# Patient Record
Sex: FEMALE | Race: OTHER | NOT HISPANIC OR LATINO | URBAN - METROPOLITAN AREA
[De-identification: names, ages, dates, MRNs, and addresses within clinical notes are randomized per-mention and may not be internally consistent; named-entity substitution may affect disease eponyms.]

---

## 2017-07-01 ENCOUNTER — INPATIENT (INPATIENT)
Facility: HOSPITAL | Age: 22
LOS: 4 days | Discharge: HOME | End: 2017-07-06
Attending: OBSTETRICS & GYNECOLOGY | Admitting: OBSTETRICS & GYNECOLOGY
Payer: MEDICARE

## 2017-07-01 VITALS — WEIGHT: 235.01 LBS | HEIGHT: 66 IN

## 2017-07-01 DIAGNOSIS — Z3A.00 WEEKS OF GESTATION OF PREGNANCY NOT SPECIFIED: ICD-10-CM

## 2017-07-01 DIAGNOSIS — O26.899 OTHER SPECIFIED PREGNANCY RELATED CONDITIONS, UNSPECIFIED TRIMESTER: ICD-10-CM

## 2017-07-01 LAB
ALBUMIN SERPL ELPH-MCNC: 3.6 G/DL — SIGNIFICANT CHANGE UP (ref 3.3–5)
ALP SERPL-CCNC: 195 U/L — HIGH (ref 40–120)
ALT FLD-CCNC: 22 U/L — SIGNIFICANT CHANGE UP (ref 10–45)
ANION GAP SERPL CALC-SCNC: 15 MMOL/L — SIGNIFICANT CHANGE UP (ref 5–17)
APPEARANCE UR: CLEAR — SIGNIFICANT CHANGE UP
APTT BLD: 28.1 SEC — SIGNIFICANT CHANGE UP (ref 27.5–37.4)
AST SERPL-CCNC: 39 U/L — SIGNIFICANT CHANGE UP (ref 10–40)
BASOPHILS NFR BLD AUTO: 0.1 % — SIGNIFICANT CHANGE UP (ref 0–2)
BILIRUB SERPL-MCNC: 0.2 MG/DL — SIGNIFICANT CHANGE UP (ref 0.2–1.2)
BILIRUB UR-MCNC: NEGATIVE — SIGNIFICANT CHANGE UP
BLD GP AB SCN SERPL QL: NEGATIVE — SIGNIFICANT CHANGE UP
BUN SERPL-MCNC: 10 MG/DL — SIGNIFICANT CHANGE UP (ref 7–23)
CALCIUM SERPL-MCNC: 9.5 MG/DL — SIGNIFICANT CHANGE UP (ref 8.4–10.5)
CHLORIDE SERPL-SCNC: 101 MMOL/L — SIGNIFICANT CHANGE UP (ref 96–108)
CO2 SERPL-SCNC: 21 MMOL/L — LOW (ref 22–31)
COLOR SPEC: YELLOW — SIGNIFICANT CHANGE UP
CREAT SERPL-MCNC: 0.5 MG/DL — SIGNIFICANT CHANGE UP (ref 0.5–1.3)
DIFF PNL FLD: NEGATIVE — SIGNIFICANT CHANGE UP
EOSINOPHIL NFR BLD AUTO: 0.9 % — SIGNIFICANT CHANGE UP (ref 0–6)
FIBRINOGEN PPP-MCNC: 463 MG/DL — HIGH (ref 258–438)
GLUCOSE SERPL-MCNC: 84 MG/DL — SIGNIFICANT CHANGE UP (ref 70–99)
GLUCOSE UR QL: NEGATIVE — SIGNIFICANT CHANGE UP
HBV SURFACE AG SER-ACNC: SIGNIFICANT CHANGE UP
HCT VFR BLD CALC: 34.4 % — LOW (ref 34.5–45)
HGB BLD-MCNC: 11.8 G/DL — SIGNIFICANT CHANGE UP (ref 11.5–15.5)
HIV 1+2 AB+HIV1 P24 AG SERPL QL IA: SIGNIFICANT CHANGE UP
INR BLD: 0.89 — SIGNIFICANT CHANGE UP (ref 0.88–1.16)
KETONES UR-MCNC: NEGATIVE — SIGNIFICANT CHANGE UP
LDH SERPL L TO P-CCNC: 245 U/L — HIGH (ref 50–242)
LEUKOCYTE ESTERASE UR-ACNC: NEGATIVE — SIGNIFICANT CHANGE UP
LYMPHOCYTES # BLD AUTO: 16.1 % — SIGNIFICANT CHANGE UP (ref 13–44)
MCHC RBC-ENTMCNC: 29.4 PG — SIGNIFICANT CHANGE UP (ref 27–34)
MCHC RBC-ENTMCNC: 34.3 G/DL — SIGNIFICANT CHANGE UP (ref 32–36)
MCV RBC AUTO: 85.8 FL — SIGNIFICANT CHANGE UP (ref 80–100)
MONOCYTES NFR BLD AUTO: 9.6 % — SIGNIFICANT CHANGE UP (ref 2–14)
NEUTROPHILS NFR BLD AUTO: 73.3 % — SIGNIFICANT CHANGE UP (ref 43–77)
NITRITE UR-MCNC: NEGATIVE — SIGNIFICANT CHANGE UP
PH UR: 7 — SIGNIFICANT CHANGE UP (ref 5–8)
PLATELET # BLD AUTO: 242 K/UL — SIGNIFICANT CHANGE UP (ref 150–400)
POTASSIUM SERPL-MCNC: 3.8 MMOL/L — SIGNIFICANT CHANGE UP (ref 3.5–5.3)
POTASSIUM SERPL-SCNC: 3.8 MMOL/L — SIGNIFICANT CHANGE UP (ref 3.5–5.3)
PROT SERPL-MCNC: 6.7 G/DL — SIGNIFICANT CHANGE UP (ref 6–8.3)
PROT UR-MCNC: 100 MG/DL
PROTHROM AB SERPL-ACNC: 9.8 SEC — SIGNIFICANT CHANGE UP (ref 9.8–12.7)
RBC # BLD: 4.01 M/UL — SIGNIFICANT CHANGE UP (ref 3.8–5.2)
RBC # FLD: 13.9 % — SIGNIFICANT CHANGE UP (ref 10.3–16.9)
RH IG SCN BLD-IMP: POSITIVE — SIGNIFICANT CHANGE UP
RH IG SCN BLD-IMP: POSITIVE — SIGNIFICANT CHANGE UP
SODIUM SERPL-SCNC: 137 MMOL/L — SIGNIFICANT CHANGE UP (ref 135–145)
SP GR SPEC: 1.01 — SIGNIFICANT CHANGE UP (ref 1–1.03)
URATE SERPL-MCNC: 4 MG/DL — SIGNIFICANT CHANGE UP (ref 2.5–7)
UROBILINOGEN FLD QL: 0.2 E.U./DL — SIGNIFICANT CHANGE UP
WBC # BLD: 10.7 K/UL — HIGH (ref 3.8–10.5)
WBC # FLD AUTO: 10.7 K/UL — HIGH (ref 3.8–10.5)

## 2017-07-01 RX ORDER — PENICILLIN G POTASSIUM 5000000 [IU]/1
2.5 POWDER, FOR SOLUTION INTRAMUSCULAR; INTRAPLEURAL; INTRATHECAL; INTRAVENOUS EVERY 4 HOURS
Qty: 0 | Refills: 0 | Status: DISCONTINUED | OUTPATIENT
Start: 2017-07-02 | End: 2017-07-02

## 2017-07-01 RX ORDER — MAGNESIUM SULFATE 500 MG/ML
2.5 VIAL (ML) INJECTION
Qty: 40 | Refills: 0 | Status: DISCONTINUED | OUTPATIENT
Start: 2017-07-01 | End: 2017-07-03

## 2017-07-01 RX ORDER — SODIUM CHLORIDE 9 MG/ML
1000 INJECTION, SOLUTION INTRAVENOUS
Qty: 0 | Refills: 0 | Status: DISCONTINUED | OUTPATIENT
Start: 2017-07-01 | End: 2017-07-02

## 2017-07-01 RX ORDER — SODIUM CHLORIDE 9 MG/ML
1000 INJECTION, SOLUTION INTRAVENOUS ONCE
Qty: 0 | Refills: 0 | Status: DISCONTINUED | OUTPATIENT
Start: 2017-07-01 | End: 2017-07-02

## 2017-07-01 RX ORDER — HYDRALAZINE HCL 50 MG
5 TABLET ORAL ONCE
Qty: 0 | Refills: 0 | Status: COMPLETED | OUTPATIENT
Start: 2017-07-01 | End: 2017-07-01

## 2017-07-01 RX ORDER — MAGNESIUM SULFATE 500 MG/ML
4 VIAL (ML) INJECTION ONCE
Qty: 0 | Refills: 0 | Status: COMPLETED | OUTPATIENT
Start: 2017-07-01 | End: 2017-07-01

## 2017-07-01 RX ORDER — PENICILLIN G POTASSIUM 5000000 [IU]/1
5 POWDER, FOR SOLUTION INTRAMUSCULAR; INTRAPLEURAL; INTRATHECAL; INTRAVENOUS ONCE
Qty: 0 | Refills: 0 | Status: COMPLETED | OUTPATIENT
Start: 2017-07-01 | End: 2017-07-01

## 2017-07-01 RX ORDER — PENICILLIN G POTASSIUM 5000000 [IU]/1
POWDER, FOR SOLUTION INTRAMUSCULAR; INTRAPLEURAL; INTRATHECAL; INTRAVENOUS
Qty: 0 | Refills: 0 | Status: DISCONTINUED | OUTPATIENT
Start: 2017-07-01 | End: 2017-07-02

## 2017-07-01 RX ORDER — CITRIC ACID/SODIUM CITRATE 300-500 MG
15 SOLUTION, ORAL ORAL EVERY 4 HOURS
Qty: 0 | Refills: 0 | Status: DISCONTINUED | OUTPATIENT
Start: 2017-07-01 | End: 2017-07-02

## 2017-07-01 RX ORDER — OXYTOCIN 10 UNIT/ML
333.33 VIAL (ML) INJECTION
Qty: 20 | Refills: 0 | Status: DISCONTINUED | OUTPATIENT
Start: 2017-07-01 | End: 2017-07-02

## 2017-07-01 RX ORDER — LABETALOL HCL 100 MG
20 TABLET ORAL ONCE
Qty: 0 | Refills: 0 | Status: COMPLETED | OUTPATIENT
Start: 2017-07-01 | End: 2017-07-01

## 2017-07-01 RX ORDER — LABETALOL HCL 100 MG
40 TABLET ORAL ONCE
Qty: 0 | Refills: 0 | Status: COMPLETED | OUTPATIENT
Start: 2017-07-01 | End: 2017-07-01

## 2017-07-01 RX ADMIN — Medication 50 GM/HR: at 23:15

## 2017-07-01 RX ADMIN — Medication 5 MILLIGRAM(S): at 22:50

## 2017-07-01 RX ADMIN — Medication 300 GRAM(S): at 22:50

## 2017-07-01 RX ADMIN — Medication 20 MILLIGRAM(S): at 21:20

## 2017-07-01 RX ADMIN — SODIUM CHLORIDE 125 MILLILITER(S): 9 INJECTION, SOLUTION INTRAVENOUS at 21:56

## 2017-07-01 RX ADMIN — Medication 40 MILLIGRAM(S): at 21:37

## 2017-07-01 RX ADMIN — PENICILLIN G POTASSIUM 200 MILLION UNIT(S): 5000000 POWDER, FOR SOLUTION INTRAMUSCULAR; INTRAPLEURAL; INTRATHECAL; INTRAVENOUS at 21:56

## 2017-07-02 LAB
ALBUMIN SERPL ELPH-MCNC: 3.1 G/DL — LOW (ref 3.3–5)
ALBUMIN SERPL ELPH-MCNC: 3.2 G/DL — LOW (ref 3.3–5)
ALP SERPL-CCNC: 172 U/L — HIGH (ref 40–120)
ALP SERPL-CCNC: 186 U/L — HIGH (ref 40–120)
ALT FLD-CCNC: 18 U/L — SIGNIFICANT CHANGE UP (ref 10–45)
ALT FLD-CCNC: 20 U/L — SIGNIFICANT CHANGE UP (ref 10–45)
ANION GAP SERPL CALC-SCNC: 13 MMOL/L — SIGNIFICANT CHANGE UP (ref 5–17)
ANION GAP SERPL CALC-SCNC: 15 MMOL/L — SIGNIFICANT CHANGE UP (ref 5–17)
APTT BLD: 27.4 SEC — LOW (ref 27.5–37.4)
AST SERPL-CCNC: 24 U/L — SIGNIFICANT CHANGE UP (ref 10–40)
AST SERPL-CCNC: 27 U/L — SIGNIFICANT CHANGE UP (ref 10–40)
BASOPHILS NFR BLD AUTO: 0.1 % — SIGNIFICANT CHANGE UP (ref 0–2)
BILIRUB SERPL-MCNC: 0.2 MG/DL — SIGNIFICANT CHANGE UP (ref 0.2–1.2)
BILIRUB SERPL-MCNC: 0.2 MG/DL — SIGNIFICANT CHANGE UP (ref 0.2–1.2)
BUN SERPL-MCNC: 7 MG/DL — SIGNIFICANT CHANGE UP (ref 7–23)
BUN SERPL-MCNC: 8 MG/DL — SIGNIFICANT CHANGE UP (ref 7–23)
CALCIUM SERPL-MCNC: 8.2 MG/DL — LOW (ref 8.4–10.5)
CALCIUM SERPL-MCNC: 8.5 MG/DL — SIGNIFICANT CHANGE UP (ref 8.4–10.5)
CHLORIDE SERPL-SCNC: 101 MMOL/L — SIGNIFICANT CHANGE UP (ref 96–108)
CHLORIDE SERPL-SCNC: 101 MMOL/L — SIGNIFICANT CHANGE UP (ref 96–108)
CO2 SERPL-SCNC: 20 MMOL/L — LOW (ref 22–31)
CO2 SERPL-SCNC: 23 MMOL/L — SIGNIFICANT CHANGE UP (ref 22–31)
CREAT SERPL-MCNC: 0.5 MG/DL — SIGNIFICANT CHANGE UP (ref 0.5–1.3)
CREAT SERPL-MCNC: 0.5 MG/DL — SIGNIFICANT CHANGE UP (ref 0.5–1.3)
EOSINOPHIL NFR BLD AUTO: 0.1 % — SIGNIFICANT CHANGE UP (ref 0–6)
FIBRINOGEN PPP-MCNC: 455 MG/DL — HIGH (ref 258–438)
GLUCOSE SERPL-MCNC: 110 MG/DL — HIGH (ref 70–99)
GLUCOSE SERPL-MCNC: 140 MG/DL — HIGH (ref 70–99)
HCT VFR BLD CALC: 34.5 % — SIGNIFICANT CHANGE UP (ref 34.5–45)
HCT VFR BLD CALC: 34.7 % — SIGNIFICANT CHANGE UP (ref 34.5–45)
HGB BLD-MCNC: 11.9 G/DL — SIGNIFICANT CHANGE UP (ref 11.5–15.5)
HGB BLD-MCNC: 12.1 G/DL — SIGNIFICANT CHANGE UP (ref 11.5–15.5)
INR BLD: 0.88 — SIGNIFICANT CHANGE UP (ref 0.88–1.16)
LDH SERPL L TO P-CCNC: 220 U/L — SIGNIFICANT CHANGE UP (ref 50–242)
LYMPHOCYTES # BLD AUTO: 8.4 % — LOW (ref 13–44)
MAGNESIUM SERPL-MCNC: 3.8 MG/DL — HIGH (ref 1.6–2.6)
MAGNESIUM SERPL-MCNC: 4.5 MG/DL — HIGH (ref 1.6–2.6)
MAGNESIUM SERPL-MCNC: 4.5 MG/DL — HIGH (ref 1.6–2.6)
MCHC RBC-ENTMCNC: 29.5 PG — SIGNIFICANT CHANGE UP (ref 27–34)
MCHC RBC-ENTMCNC: 29.6 PG — SIGNIFICANT CHANGE UP (ref 27–34)
MCHC RBC-ENTMCNC: 34.5 G/DL — SIGNIFICANT CHANGE UP (ref 32–36)
MCHC RBC-ENTMCNC: 34.9 G/DL — SIGNIFICANT CHANGE UP (ref 32–36)
MCV RBC AUTO: 84.8 FL — SIGNIFICANT CHANGE UP (ref 80–100)
MCV RBC AUTO: 85.6 FL — SIGNIFICANT CHANGE UP (ref 80–100)
MONOCYTES NFR BLD AUTO: 6.1 % — SIGNIFICANT CHANGE UP (ref 2–14)
NEUTROPHILS NFR BLD AUTO: 85.3 % — HIGH (ref 43–77)
PLATELET # BLD AUTO: 230 K/UL — SIGNIFICANT CHANGE UP (ref 150–400)
PLATELET # BLD AUTO: 235 K/UL — SIGNIFICANT CHANGE UP (ref 150–400)
POTASSIUM SERPL-MCNC: 3.6 MMOL/L — SIGNIFICANT CHANGE UP (ref 3.5–5.3)
POTASSIUM SERPL-MCNC: 4.3 MMOL/L — SIGNIFICANT CHANGE UP (ref 3.5–5.3)
POTASSIUM SERPL-SCNC: 3.6 MMOL/L — SIGNIFICANT CHANGE UP (ref 3.5–5.3)
POTASSIUM SERPL-SCNC: 4.3 MMOL/L — SIGNIFICANT CHANGE UP (ref 3.5–5.3)
PROT SERPL-MCNC: 6.1 G/DL — SIGNIFICANT CHANGE UP (ref 6–8.3)
PROT SERPL-MCNC: 6.3 G/DL — SIGNIFICANT CHANGE UP (ref 6–8.3)
PROTHROM AB SERPL-ACNC: 9.7 SEC — LOW (ref 9.8–12.7)
RBC # BLD: 4.03 M/UL — SIGNIFICANT CHANGE UP (ref 3.8–5.2)
RBC # BLD: 4.09 M/UL — SIGNIFICANT CHANGE UP (ref 3.8–5.2)
RBC # FLD: 14 % — SIGNIFICANT CHANGE UP (ref 10.3–16.9)
RBC # FLD: 14.1 % — SIGNIFICANT CHANGE UP (ref 10.3–16.9)
SODIUM SERPL-SCNC: 136 MMOL/L — SIGNIFICANT CHANGE UP (ref 135–145)
SODIUM SERPL-SCNC: 137 MMOL/L — SIGNIFICANT CHANGE UP (ref 135–145)
URATE SERPL-MCNC: 3.6 MG/DL — SIGNIFICANT CHANGE UP (ref 2.5–7)
WBC # BLD: 12.7 K/UL — HIGH (ref 3.8–10.5)
WBC # BLD: 17 K/UL — HIGH (ref 3.8–10.5)
WBC # FLD AUTO: 12.7 K/UL — HIGH (ref 3.8–10.5)
WBC # FLD AUTO: 17 K/UL — HIGH (ref 3.8–10.5)

## 2017-07-02 RX ORDER — SODIUM CHLORIDE 9 MG/ML
1000 INJECTION, SOLUTION INTRAVENOUS
Qty: 0 | Refills: 0 | Status: DISCONTINUED | OUTPATIENT
Start: 2017-07-02 | End: 2017-07-06

## 2017-07-02 RX ORDER — ACETAMINOPHEN 500 MG
650 TABLET ORAL EVERY 6 HOURS
Qty: 0 | Refills: 0 | Status: DISCONTINUED | OUTPATIENT
Start: 2017-07-02 | End: 2017-07-06

## 2017-07-02 RX ORDER — IBUPROFEN 200 MG
600 TABLET ORAL EVERY 6 HOURS
Qty: 0 | Refills: 0 | Status: DISCONTINUED | OUTPATIENT
Start: 2017-07-02 | End: 2017-07-06

## 2017-07-02 RX ORDER — MORPHINE SULFATE 50 MG/1
3 CAPSULE, EXTENDED RELEASE ORAL ONCE
Qty: 0 | Refills: 0 | Status: DISCONTINUED | OUTPATIENT
Start: 2017-07-02 | End: 2017-07-06

## 2017-07-02 RX ORDER — ONDANSETRON 8 MG/1
4 TABLET, FILM COATED ORAL EVERY 6 HOURS
Qty: 0 | Refills: 0 | Status: DISCONTINUED | OUTPATIENT
Start: 2017-07-02 | End: 2017-07-06

## 2017-07-02 RX ORDER — TETANUS TOXOID, REDUCED DIPHTHERIA TOXOID AND ACELLULAR PERTUSSIS VACCINE, ADSORBED 5; 2.5; 8; 8; 2.5 [IU]/.5ML; [IU]/.5ML; UG/.5ML; UG/.5ML; UG/.5ML
0.5 SUSPENSION INTRAMUSCULAR ONCE
Qty: 0 | Refills: 0 | Status: DISCONTINUED | OUTPATIENT
Start: 2017-07-02 | End: 2017-07-06

## 2017-07-02 RX ORDER — DOCUSATE SODIUM 100 MG
100 CAPSULE ORAL
Qty: 0 | Refills: 0 | Status: DISCONTINUED | OUTPATIENT
Start: 2017-07-02 | End: 2017-07-06

## 2017-07-02 RX ORDER — LANOLIN
1 OINTMENT (GRAM) TOPICAL
Qty: 0 | Refills: 0 | Status: DISCONTINUED | OUTPATIENT
Start: 2017-07-02 | End: 2017-07-06

## 2017-07-02 RX ORDER — GLYCERIN ADULT
1 SUPPOSITORY, RECTAL RECTAL AT BEDTIME
Qty: 0 | Refills: 0 | Status: DISCONTINUED | OUTPATIENT
Start: 2017-07-02 | End: 2017-07-06

## 2017-07-02 RX ORDER — OXYTOCIN 10 UNIT/ML
2 VIAL (ML) INJECTION
Qty: 30 | Refills: 0 | Status: DISCONTINUED | OUTPATIENT
Start: 2017-07-02 | End: 2017-07-03

## 2017-07-02 RX ORDER — HYDRALAZINE HCL 50 MG
10 TABLET ORAL ONCE
Qty: 0 | Refills: 0 | Status: COMPLETED | OUTPATIENT
Start: 2017-07-02 | End: 2017-07-02

## 2017-07-02 RX ORDER — SIMETHICONE 80 MG/1
80 TABLET, CHEWABLE ORAL EVERY 4 HOURS
Qty: 0 | Refills: 0 | Status: DISCONTINUED | OUTPATIENT
Start: 2017-07-02 | End: 2017-07-06

## 2017-07-02 RX ORDER — DIPHENHYDRAMINE HCL 50 MG
25 CAPSULE ORAL EVERY 6 HOURS
Qty: 0 | Refills: 0 | Status: DISCONTINUED | OUTPATIENT
Start: 2017-07-02 | End: 2017-07-06

## 2017-07-02 RX ORDER — FERROUS SULFATE 325(65) MG
325 TABLET ORAL DAILY
Qty: 0 | Refills: 0 | Status: DISCONTINUED | OUTPATIENT
Start: 2017-07-02 | End: 2017-07-06

## 2017-07-02 RX ORDER — NALOXONE HYDROCHLORIDE 4 MG/.1ML
0.1 SPRAY NASAL
Qty: 0 | Refills: 0 | Status: DISCONTINUED | OUTPATIENT
Start: 2017-07-02 | End: 2017-07-06

## 2017-07-02 RX ORDER — LABETALOL HCL 100 MG
200 TABLET ORAL
Qty: 0 | Refills: 0 | Status: DISCONTINUED | OUTPATIENT
Start: 2017-07-02 | End: 2017-07-06

## 2017-07-02 RX ORDER — HEPARIN SODIUM 5000 [USP'U]/ML
5000 INJECTION INTRAVENOUS; SUBCUTANEOUS EVERY 12 HOURS
Qty: 0 | Refills: 0 | Status: DISCONTINUED | OUTPATIENT
Start: 2017-07-02 | End: 2017-07-06

## 2017-07-02 RX ADMIN — HEPARIN SODIUM 5000 UNIT(S): 5000 INJECTION INTRAVENOUS; SUBCUTANEOUS at 21:15

## 2017-07-02 RX ADMIN — Medication 600 MILLIGRAM(S): at 18:54

## 2017-07-02 RX ADMIN — Medication 600 MILLIGRAM(S): at 20:10

## 2017-07-02 RX ADMIN — Medication 200 MILLIGRAM(S): at 10:56

## 2017-07-02 RX ADMIN — Medication 200 MILLIGRAM(S): at 18:13

## 2017-07-02 RX ADMIN — Medication 2 MILLIUNIT(S)/MIN: at 00:50

## 2017-07-02 RX ADMIN — PENICILLIN G POTASSIUM 200 MILLION UNIT(S): 5000000 POWDER, FOR SOLUTION INTRAMUSCULAR; INTRAPLEURAL; INTRATHECAL; INTRAVENOUS at 02:15

## 2017-07-02 RX ADMIN — Medication 10 MILLIGRAM(S): at 00:20

## 2017-07-02 RX ADMIN — SODIUM CHLORIDE 75 MILLILITER(S): 9 INJECTION, SOLUTION INTRAVENOUS at 14:10

## 2017-07-02 NOTE — PROGRESS NOTE ADULT - SUBJECTIVE AND OBJECTIVE BOX
Pt seen and evaluated at bedside, clinical magnesium check, reports feeling well, denies HA, dizziness, CP, palpitations, visual disturbances, RUQ/epigastric pain. Severe hypertension noted previously, now in 140s/90s, physical exam unremarkable, reflexes +1, SCDs in place, on IV mg, will start 200mg labetalol PO, draw labs and magnesium level. Dr. Beck notified.

## 2017-07-02 NOTE — PROGRESS NOTE ADULT - SUBJECTIVE AND OBJECTIVE BOX
Patient was evaluated to rule out magnesium toxicity.    She reports vomiting after eating 10 minutes prior to my exam, but now she is not nauseous. She also denies, headache, flushing, SOB, or lethargy.     She was resting comfortably upon entering the room.  Cardiovascular exam was normal with RRR, normal S1, S2, no M/R/G.   Pulmonary exam was negative for rales, wheezes, rhonchi. Normal respiratory effort.  Neuro exam: reflexes 2+ B/L upper and lower extremities. Patient was evaluated to rule out magnesium toxicity.    She reports vomiting after eating 10 minutes prior to my exam, but now she is not nauseous. She also denies, headache, flushing, SOB, or lethargy.     She was resting comfortably upon entering the room.  Vital Signs Last 24 Hrs  T(C): 36.5 (02 Jul 2017 15:00), Max: 36.9 (02 Jul 2017 11:14)  T(F): 97.7 (02 Jul 2017 15:00), Max: 98.4 (02 Jul 2017 11:14)  HR: 81 (02 Jul 2017 15:00) (70 - 96)  BP: 128/87 (02 Jul 2017 15:00) (116/63 - 183/103)  BP(mean): --  RR: 18 (02 Jul 2017 15:00) (16 - 156)  SpO2: 99% (02 Jul 2017 15:00) (96% - 100%)  Cardiovascular exam was normal with RRR, normal S1, S2, no M/R/G.   Pulmonary exam was negative for rales, wheezes, rhonchi. Normal respiratory effort.  Neuro exam: reflexes 2+ B/L upper and lower extremities.                          11.9   17.0  )-----------( 235      ( 02 Jul 2017 10:52 )             34.5   07-02    137  |  101  |  8   ----------------------------<  140<H>  4.3   |  23  |  0.50    Ca    8.2<L>      02 Jul 2017 10:52  Mg     4.5     07-02    TPro  6.1  /  Alb  3.1<L>  /  TBili  0.2  /  DBili  x   /  AST  24  /  ALT  18  /  AlkPhos  172<H>  07-02

## 2017-07-03 LAB
ALBUMIN SERPL ELPH-MCNC: 2.7 G/DL — LOW (ref 3.3–5)
ALP SERPL-CCNC: 130 U/L — HIGH (ref 40–120)
ALT FLD-CCNC: 14 U/L — SIGNIFICANT CHANGE UP (ref 10–45)
ANION GAP SERPL CALC-SCNC: 10 MMOL/L — SIGNIFICANT CHANGE UP (ref 5–17)
AST SERPL-CCNC: 18 U/L — SIGNIFICANT CHANGE UP (ref 10–40)
BASOPHILS NFR BLD AUTO: 0.1 % — SIGNIFICANT CHANGE UP (ref 0–2)
BASOPHILS NFR BLD AUTO: 0.1 % — SIGNIFICANT CHANGE UP (ref 0–2)
BILIRUB SERPL-MCNC: 0.3 MG/DL — SIGNIFICANT CHANGE UP (ref 0.2–1.2)
BUN SERPL-MCNC: 6 MG/DL — LOW (ref 7–23)
CALCIUM SERPL-MCNC: 7.2 MG/DL — LOW (ref 8.4–10.5)
CHLORIDE SERPL-SCNC: 100 MMOL/L — SIGNIFICANT CHANGE UP (ref 96–108)
CO2 SERPL-SCNC: 23 MMOL/L — SIGNIFICANT CHANGE UP (ref 22–31)
CREAT SERPL-MCNC: 0.5 MG/DL — SIGNIFICANT CHANGE UP (ref 0.5–1.3)
EOSINOPHIL NFR BLD AUTO: 0.1 % — SIGNIFICANT CHANGE UP (ref 0–6)
EOSINOPHIL NFR BLD AUTO: 0.3 % — SIGNIFICANT CHANGE UP (ref 0–6)
GLUCOSE SERPL-MCNC: 106 MG/DL — HIGH (ref 70–99)
HCT VFR BLD CALC: 27.9 % — LOW (ref 34.5–45)
HCT VFR BLD CALC: 28.2 % — LOW (ref 34.5–45)
HGB BLD-MCNC: 9.6 G/DL — LOW (ref 11.5–15.5)
HGB BLD-MCNC: 9.6 G/DL — LOW (ref 11.5–15.5)
LDH SERPL L TO P-CCNC: 203 U/L — SIGNIFICANT CHANGE UP (ref 50–242)
LYMPHOCYTES # BLD AUTO: 12.1 % — LOW (ref 13–44)
LYMPHOCYTES # BLD AUTO: 12.7 % — LOW (ref 13–44)
MAGNESIUM SERPL-MCNC: 5.4 MG/DL — HIGH (ref 1.6–2.6)
MCHC RBC-ENTMCNC: 29.4 PG — SIGNIFICANT CHANGE UP (ref 27–34)
MCHC RBC-ENTMCNC: 29.6 PG — SIGNIFICANT CHANGE UP (ref 27–34)
MCHC RBC-ENTMCNC: 34 G/DL — SIGNIFICANT CHANGE UP (ref 32–36)
MCHC RBC-ENTMCNC: 34.4 G/DL — SIGNIFICANT CHANGE UP (ref 32–36)
MCV RBC AUTO: 86.1 FL — SIGNIFICANT CHANGE UP (ref 80–100)
MCV RBC AUTO: 86.2 FL — SIGNIFICANT CHANGE UP (ref 80–100)
MONOCYTES NFR BLD AUTO: 9.2 % — SIGNIFICANT CHANGE UP (ref 2–14)
MONOCYTES NFR BLD AUTO: 9.4 % — SIGNIFICANT CHANGE UP (ref 2–14)
NEUTROPHILS NFR BLD AUTO: 77.5 % — HIGH (ref 43–77)
NEUTROPHILS NFR BLD AUTO: 78.5 % — HIGH (ref 43–77)
PLATELET # BLD AUTO: 186 K/UL — SIGNIFICANT CHANGE UP (ref 150–400)
PLATELET # BLD AUTO: 187 K/UL — SIGNIFICANT CHANGE UP (ref 150–400)
POTASSIUM SERPL-MCNC: 3.7 MMOL/L — SIGNIFICANT CHANGE UP (ref 3.5–5.3)
POTASSIUM SERPL-SCNC: 3.7 MMOL/L — SIGNIFICANT CHANGE UP (ref 3.5–5.3)
PROT SERPL-MCNC: 5.1 G/DL — LOW (ref 6–8.3)
RBC # BLD: 3.24 M/UL — LOW (ref 3.8–5.2)
RBC # BLD: 3.27 M/UL — LOW (ref 3.8–5.2)
RBC # FLD: 14 % — SIGNIFICANT CHANGE UP (ref 10.3–16.9)
RBC # FLD: 14.2 % — SIGNIFICANT CHANGE UP (ref 10.3–16.9)
RUBV IGG SER-ACNC: 2.3 INDEX — SIGNIFICANT CHANGE UP
RUBV IGG SER-IMP: POSITIVE — SIGNIFICANT CHANGE UP
SODIUM SERPL-SCNC: 133 MMOL/L — LOW (ref 135–145)
T PALLIDUM AB TITR SER: NEGATIVE — SIGNIFICANT CHANGE UP
URATE SERPL-MCNC: 4.2 MG/DL — SIGNIFICANT CHANGE UP (ref 2.5–7)
WBC # BLD: 11 K/UL — HIGH (ref 3.8–10.5)
WBC # BLD: 11.3 K/UL — HIGH (ref 3.8–10.5)
WBC # FLD AUTO: 11 K/UL — HIGH (ref 3.8–10.5)
WBC # FLD AUTO: 11.3 K/UL — HIGH (ref 3.8–10.5)

## 2017-07-03 RX ORDER — ACETAMINOPHEN 500 MG
650 TABLET ORAL EVERY 6 HOURS
Qty: 0 | Refills: 0 | Status: DISCONTINUED | OUTPATIENT
Start: 2017-07-03 | End: 2017-07-06

## 2017-07-03 RX ADMIN — HEPARIN SODIUM 5000 UNIT(S): 5000 INJECTION INTRAVENOUS; SUBCUTANEOUS at 21:10

## 2017-07-03 RX ADMIN — Medication 100 MILLIGRAM(S): at 21:11

## 2017-07-03 RX ADMIN — Medication 650 MILLIGRAM(S): at 17:00

## 2017-07-03 RX ADMIN — Medication 100 MILLIGRAM(S): at 15:08

## 2017-07-03 RX ADMIN — Medication 650 MILLIGRAM(S): at 16:13

## 2017-07-03 RX ADMIN — Medication 200 MILLIGRAM(S): at 17:44

## 2017-07-03 RX ADMIN — HEPARIN SODIUM 5000 UNIT(S): 5000 INJECTION INTRAVENOUS; SUBCUTANEOUS at 09:41

## 2017-07-03 RX ADMIN — Medication 325 MILLIGRAM(S): at 15:08

## 2017-07-03 RX ADMIN — Medication 600 MILLIGRAM(S): at 21:11

## 2017-07-03 RX ADMIN — Medication 650 MILLIGRAM(S): at 09:51

## 2017-07-03 RX ADMIN — Medication 650 MILLIGRAM(S): at 22:40

## 2017-07-03 RX ADMIN — Medication 650 MILLIGRAM(S): at 23:38

## 2017-07-03 RX ADMIN — Medication 600 MILLIGRAM(S): at 22:11

## 2017-07-03 RX ADMIN — Medication 200 MILLIGRAM(S): at 06:14

## 2017-07-03 RX ADMIN — Medication 600 MILLIGRAM(S): at 07:03

## 2017-07-03 RX ADMIN — Medication 600 MILLIGRAM(S): at 07:57

## 2017-07-03 RX ADMIN — Medication 600 MILLIGRAM(S): at 15:43

## 2017-07-03 RX ADMIN — Medication 600 MILLIGRAM(S): at 01:45

## 2017-07-03 RX ADMIN — Medication 1 TABLET(S): at 15:06

## 2017-07-03 RX ADMIN — Medication 600 MILLIGRAM(S): at 15:07

## 2017-07-03 RX ADMIN — Medication 600 MILLIGRAM(S): at 00:48

## 2017-07-03 NOTE — PROGRESS NOTE ADULT - SUBJECTIVE AND OBJECTIVE BOX
Pt denies any headache, scotomata, blurred vision, RUQ pain, N/V, difficulty breathing, chest pain, or weakness.  BP range: 114 -134/64-87  RR: 18    Physical exam:   Heart: s1, s2, no murmurs, rubs, gallops  Lungs: clear to auscultation bilaterally, no wheezes, rhonchi  Ext: mild edema in bilateral hands and lower extremities, SVT in place  Neuro: DTR WNR      A/P:  -magnesium level at 0019 was 5.4, up from 4.5 at 1655 on 7/2  -currently no signs of magnesium toxicity- continue to monitor and redraw magnesium level at S: Pt denies any headache, scotomata, blurred vision, RUQ pain, N/V, difficulty breathing, chest pain, or weakness.    Physical exam:  BP range: 114 -134/64-87  RR: 18   Heart: s1, s2, no murmurs, rubs, gallops  Lungs: clear to auscultation bilaterally, no wheezes, rhonchi  Ext: mild edema in bilateral hands and lower extremities, SVT in place  Neuro: DTR 3+    A/P:  23 y/o  @ 39w3d POD#1 on IV Mg for PEC w/ severe features.     -magnesium level at 0019 was 5.4, up from 4.5 at 1655 on   -currently no signs of magnesium toxicity  - plan to d/c mg @ 7:30am

## 2017-07-03 NOTE — PROGRESS NOTE ADULT - SUBJECTIVE AND OBJECTIVE BOX
Patient evaluated at bedside.   She reports pain is well controlled with OPM.  She denies headache, dizziness, nausea, vomiting.  She has been ambulating without assistance.    Physical Exam:  Vital Signs Last 24 Hrs  T(C): 36.8 (03 Jul 2017 10:00), Max: 36.8 (02 Jul 2017 19:00)  T(F): 98.2 (03 Jul 2017 10:00), Max: 98.3 (02 Jul 2017 19:00)  HR: 83 (03 Jul 2017 10:00) (73 - 90)  BP: 97/59 (03 Jul 2017 10:00) (97/59 - 147/84)  BP(mean): --  RR: 17 (03 Jul 2017 10:00) (16 - 156)  SpO2: 98% (03 Jul 2017 10:00) (95% - 99%)    07-02 @ 07:01  -  07-03 @ 07:00  --------------------------------------------------------  IN: 0 mL / OUT: 4775 mL / NET: -4775 mL    07-03 @ 07:01  -  07-03 @ 13:23  --------------------------------------------------------  IN: 0 mL / OUT: 1200 mL / NET: -1200 mL        GA: NAD, A+0 x 3  CV: RRR  Pulm: Normal work of breathing  Breasts: soft, nontender, no palpable masses  Abd: + BS, soft, nontender, nondistended, no rebound or guarding, uterus firm at midline,  fb below umbilicus  Incision: well approximated, no erythema or discharge  : lochia WNL  Extremities: no swelling or calf tenderness                            9.6    11.0  )-----------( 186      ( 03 Jul 2017 05:25 )             28.2     07-03    133<L>  |  100  |  6<L>  ----------------------------<  106<H>  3.7   |  23  |  0.50    Ca    7.2<L>      03 Jul 2017 00:19  Mg     5.4     07-03    TPro  5.1<L>  /  Alb  2.7<L>  /  TBili  0.3  /  DBili  x   /  AST  18  /  ALT  14  /  AlkPhos  130<H>  07-03      PT/INR - ( 02 Jul 2017 10:52 )   PT: 9.7 sec;   INR: 0.88          PTT - ( 02 Jul 2017 10:52 )  PTT:27.4 sec

## 2017-07-03 NOTE — PROGRESS NOTE ADULT - SUBJECTIVE AND OBJECTIVE BOX
Patient evaluated at bedside.   She reports pain is well controlled with  She denies headache, dizziness, chest pain, palpitations, shortness of breathe, nausea, vomiting or heavy vaginal bleeding  no toxic complaints.       Physical Exam:  Vital Signs Last 24 Hrs  T(C): 36.8 (03 Jul 2017 05:05), Max: 36.9 (02 Jul 2017 11:14)  T(F): 98.2 (03 Jul 2017 05:05), Max: 98.4 (02 Jul 2017 11:14)  HR: 90 (03 Jul 2017 05:05) (70 - 96)  BP: 130/79 (03 Jul 2017 05:05) (114/64 - 183/103)  BP(mean): --  RR: 16 (03 Jul 2017 05:05) (16 - 156)  SpO2: 98% (03 Jul 2017 05:05) (96% - 100%)    GA: NAD, A+0 x 3  CV: RRR  Pulm: CTAB  Abd: + BS, soft, nontender, nondistended, no rebound or guarding, incision clean, dry and intact, uterus firm at midline,  fb below umbilicus  : lochia WNL  Nagy: will remove nagy  Extremities: no swelling or calf tenderness, reflexes +2 bilaterally                            9.6    11.0  )-----------( 186      ( 03 Jul 2017 05:25 )             28.2     07-03    133<L>  |  100  |  6<L>  ----------------------------<  106<H>  3.7   |  23  |  0.50    Ca    7.2<L>      03 Jul 2017 00:19  Mg     5.4     07-03    TPro  5.1<L>  /  Alb  2.7<L>  /  TBili  0.3  /  DBili  x   /  AST  18  /  ALT  14  /  AlkPhos  130<H>  07-03    Magnesium, Serum: 5.4 mg/dL (07-03 @ 00:19)    PT/INR - ( 02 Jul 2017 10:52 )   PT: 9.7 sec;   INR: 0.88          PTT - ( 02 Jul 2017 10:52 )  PTT:27.4 sec

## 2017-07-03 NOTE — PROGRESS NOTE ADULT - ASSESSMENT
A/P  yo 22y G1  P 1  s/p c/s, POD #1  ,stable  1. Pain:opm  2. GI:reg  3. : TOV  4. DVT prophylaxis:scd, SQH  5. Dispo:pod4  6.f/u BP

## 2017-07-04 RX ADMIN — Medication 600 MILLIGRAM(S): at 08:36

## 2017-07-04 RX ADMIN — HEPARIN SODIUM 5000 UNIT(S): 5000 INJECTION INTRAVENOUS; SUBCUTANEOUS at 08:37

## 2017-07-04 RX ADMIN — Medication 325 MILLIGRAM(S): at 14:04

## 2017-07-04 RX ADMIN — Medication 600 MILLIGRAM(S): at 15:54

## 2017-07-04 RX ADMIN — Medication 600 MILLIGRAM(S): at 07:49

## 2017-07-04 RX ADMIN — Medication 200 MILLIGRAM(S): at 06:48

## 2017-07-04 RX ADMIN — Medication 100 MILLIGRAM(S): at 14:04

## 2017-07-04 RX ADMIN — Medication 650 MILLIGRAM(S): at 14:39

## 2017-07-04 RX ADMIN — Medication 200 MILLIGRAM(S): at 17:49

## 2017-07-04 RX ADMIN — Medication 650 MILLIGRAM(S): at 14:05

## 2017-07-04 RX ADMIN — Medication 650 MILLIGRAM(S): at 22:22

## 2017-07-04 RX ADMIN — Medication 1 TABLET(S): at 14:04

## 2017-07-04 RX ADMIN — Medication 650 MILLIGRAM(S): at 21:32

## 2017-07-04 RX ADMIN — HEPARIN SODIUM 5000 UNIT(S): 5000 INJECTION INTRAVENOUS; SUBCUTANEOUS at 21:29

## 2017-07-04 RX ADMIN — Medication 600 MILLIGRAM(S): at 16:30

## 2017-07-04 NOTE — LACTATION INITIAL EVALUATION - INFANT FEEDING PLAN COMMENT, OB PROFILE
Breastfeed on demand. Mother to double pump as taught and reviewed by myself, for any missed or ineffective feeds. Manual expression also taught with proper return demo and colostrum collected to administer to baby. To f/u as needed.

## 2017-07-04 NOTE — PROGRESS NOTE ADULT - ASSESSMENT
A/P  yo 22y    s/p c/s, POD # 1 ,stable  1. Pain:OPM  2. GI:reg  3. : VOIDING   4. DVT prophylaxis:sqh  5. Dispo:POD4

## 2017-07-04 NOTE — LACTATION INITIAL EVALUATION - PRO FEEDING PLAN INFANT OB
Patient unaware of implications of giving formula prior to visit. Milk production feedback system and potential for bottle nipple preference were explained. Frequent visitation of baby and plenty of skin to skin contact encouraged./breast and formula  feeding…

## 2017-07-04 NOTE — PROGRESS NOTE ADULT - SUBJECTIVE AND OBJECTIVE BOX
Patient evaluated at bedside.   She reports pain is well controlled with  She denies headache, dizziness, chest pain, palpitations, shortness of breathe, nausea, vomiting or heavy vaginal bleeding.  She has been ambulating without assistance, voiding spontaneously, passing gas, tolerating regular diet     Physical Exam:  Vital Signs Last 24 Hrs  T(C): 36.6 (04 Jul 2017 06:10), Max: 37.7 (03 Jul 2017 17:43)  T(F): 97.9 (04 Jul 2017 06:10), Max: 99.8 (03 Jul 2017 17:43)  HR: 85 (04 Jul 2017 06:10) (83 - 90)  BP: 143/88 (04 Jul 2017 06:10) (97/59 - 143/88)  BP(mean): --  RR: 18 (04 Jul 2017 06:10) (16 - 20)  SpO2: 97% (04 Jul 2017 06:10) (95% - 99%)    GA: NAD, A+0 x 3  Abd: + BS, soft, nontender, nondistended, no rebound or guarding, incision clean, dry and intact, uterus firm at midline,  fb below umbilicus  : lochia WNL  Extremities: no swelling or calf tenderness, reflexes +2 bilaterally                            9.6    11.0  )-----------( 186      ( 03 Jul 2017 05:25 )             28.2     07-03    133<L>  |  100  |  6<L>  ----------------------------<  106<H>  3.7   |  23  |  0.50    Ca    7.2<L>      03 Jul 2017 00:19  Mg     5.4     07-03    TPro  5.1<L>  /  Alb  2.7<L>  /  TBili  0.3  /  DBili  x   /  AST  18  /  ALT  14  /  AlkPhos  130<H>  07-03      PT/INR - ( 02 Jul 2017 10:52 )   PT: 9.7 sec;   INR: 0.88          PTT - ( 02 Jul 2017 10:52 )  PTT:27.4 sec

## 2017-07-05 RX ADMIN — HEPARIN SODIUM 5000 UNIT(S): 5000 INJECTION INTRAVENOUS; SUBCUTANEOUS at 21:04

## 2017-07-05 RX ADMIN — Medication 600 MILLIGRAM(S): at 18:50

## 2017-07-05 RX ADMIN — HEPARIN SODIUM 5000 UNIT(S): 5000 INJECTION INTRAVENOUS; SUBCUTANEOUS at 10:38

## 2017-07-05 RX ADMIN — Medication 100 MILLIGRAM(S): at 21:04

## 2017-07-05 RX ADMIN — Medication 1 APPLICATION(S): at 06:58

## 2017-07-05 RX ADMIN — Medication 600 MILLIGRAM(S): at 17:56

## 2017-07-05 RX ADMIN — Medication 1 TABLET(S): at 10:39

## 2017-07-05 RX ADMIN — Medication 200 MILLIGRAM(S): at 05:48

## 2017-07-05 RX ADMIN — Medication 600 MILLIGRAM(S): at 03:20

## 2017-07-05 RX ADMIN — Medication 600 MILLIGRAM(S): at 10:38

## 2017-07-05 RX ADMIN — Medication 600 MILLIGRAM(S): at 02:33

## 2017-07-05 RX ADMIN — Medication 600 MILLIGRAM(S): at 11:38

## 2017-07-05 RX ADMIN — Medication 100 MILLIGRAM(S): at 10:39

## 2017-07-05 RX ADMIN — Medication 325 MILLIGRAM(S): at 10:39

## 2017-07-05 RX ADMIN — Medication 200 MILLIGRAM(S): at 17:56

## 2017-07-05 NOTE — PROGRESS NOTE ADULT - ASSESSMENT
A/P  yo 22y    s/p c/s, POD #3  ,stable  1. Pain:opm  2. GI:reg  3. : voiding   4. DVT prophylaxis:scd  5. Dispo:  POD4

## 2017-07-05 NOTE — PROGRESS NOTE ADULT - SUBJECTIVE AND OBJECTIVE BOX
Patient evaluated at bedside.   She reports pain is well controlled with  She denies headache, dizziness, chest pain, palpitations, shortness of breathe, nausea, vomiting or heavy vaginal bleeding.  She has been ambulating without assistance, voiding spontaneously, passing gas, tolerating regular diet and i    Physical Exam:  Vital Signs Last 24 Hrs  T(C): 36.6 (05 Jul 2017 02:00), Max: 36.7 (04 Jul 2017 10:11)  T(F): 97.9 (05 Jul 2017 02:00), Max: 98.1 (04 Jul 2017 17:26)  HR: 95 (05 Jul 2017 02:00) (77 - 95)  BP: 137/92 (05 Jul 2017 02:15) (125/79 - 142/97)  BP(mean): --  RR: 18 (05 Jul 2017 02:00) (18 - 19)  SpO2: 98% (05 Jul 2017 02:00) (97% - 98%)    GA: NAD, A+0 x 3  Abd: + BS, soft, nontender, nondistended, no rebound or guarding, incision clean, dry and intact, uterus firm at midline,  fb below umbilicus  : lochia WNL  Extremities: no swelling or calf tenderness, reflexes +2 bilaterally

## 2017-07-06 VITALS — WEIGHT: 207.9 LBS

## 2017-07-06 PROCEDURE — 81001 URINALYSIS AUTO W/SCOPE: CPT

## 2017-07-06 PROCEDURE — 86901 BLOOD TYPING SEROLOGIC RH(D): CPT

## 2017-07-06 PROCEDURE — 86780 TREPONEMA PALLIDUM: CPT

## 2017-07-06 PROCEDURE — 85610 PROTHROMBIN TIME: CPT

## 2017-07-06 PROCEDURE — 85730 THROMBOPLASTIN TIME PARTIAL: CPT

## 2017-07-06 PROCEDURE — 99214 OFFICE O/P EST MOD 30 MIN: CPT

## 2017-07-06 PROCEDURE — 87389 HIV-1 AG W/HIV-1&-2 AB AG IA: CPT

## 2017-07-06 PROCEDURE — 80053 COMPREHEN METABOLIC PANEL: CPT

## 2017-07-06 PROCEDURE — 83735 ASSAY OF MAGNESIUM: CPT

## 2017-07-06 PROCEDURE — 36415 COLL VENOUS BLD VENIPUNCTURE: CPT

## 2017-07-06 PROCEDURE — 84550 ASSAY OF BLOOD/URIC ACID: CPT

## 2017-07-06 PROCEDURE — 86900 BLOOD TYPING SEROLOGIC ABO: CPT

## 2017-07-06 PROCEDURE — 85025 COMPLETE CBC W/AUTO DIFF WBC: CPT

## 2017-07-06 PROCEDURE — 85027 COMPLETE CBC AUTOMATED: CPT

## 2017-07-06 PROCEDURE — 86850 RBC ANTIBODY SCREEN: CPT

## 2017-07-06 PROCEDURE — 88307 TISSUE EXAM BY PATHOLOGIST: CPT

## 2017-07-06 PROCEDURE — 87340 HEPATITIS B SURFACE AG IA: CPT

## 2017-07-06 PROCEDURE — 83615 LACTATE (LD) (LDH) ENZYME: CPT

## 2017-07-06 PROCEDURE — 86762 RUBELLA ANTIBODY: CPT

## 2017-07-06 PROCEDURE — 85384 FIBRINOGEN ACTIVITY: CPT

## 2017-07-06 RX ADMIN — Medication 600 MILLIGRAM(S): at 02:52

## 2017-07-06 RX ADMIN — Medication 600 MILLIGRAM(S): at 10:47

## 2017-07-06 RX ADMIN — Medication 650 MILLIGRAM(S): at 06:50

## 2017-07-06 RX ADMIN — Medication 600 MILLIGRAM(S): at 09:47

## 2017-07-06 RX ADMIN — HEPARIN SODIUM 5000 UNIT(S): 5000 INJECTION INTRAVENOUS; SUBCUTANEOUS at 09:46

## 2017-07-06 RX ADMIN — Medication 200 MILLIGRAM(S): at 05:53

## 2017-07-06 RX ADMIN — Medication 650 MILLIGRAM(S): at 05:56

## 2017-07-06 RX ADMIN — Medication 600 MILLIGRAM(S): at 03:45

## 2017-07-06 NOTE — DIETITIAN INITIAL EVALUATION ADULT. - ENERGY NEEDS
Height: 5'6" Weight: 235lbs, PPW: 235lb, IBW 130lbs+/-10%, %IBW(PPW) 160%, BMI 37.9  IBW used for calculations as pt >120% of IBW using PPW  Needs adjusted for s/p c/s and lactation (+300kcal/day)  Energy needs estimated to be 1772-2067kcal/day (25-30kcal/kg +300kcal)

## 2017-07-06 NOTE — PROGRESS NOTE ADULT - SUBJECTIVE AND OBJECTIVE BOX
Patient evaluated at bedside.   She reports pain is well controlled with  She denies headache, dizziness, chest pain, palpitations, shortness of breathe, nausea, vomiting or heavy vaginal bleeding.  She has been ambulating without assistance, voiding spontaneously, passing gas, tolerating regular diet and is breastfeeding.    Physical Exam:  Vital Signs Last 24 Hrs  T(C): 36.4 (06 Jul 2017 06:02), Max: 36.7 (05 Jul 2017 22:21)  T(F): 97.6 (06 Jul 2017 06:02), Max: 98.1 (05 Jul 2017 22:21)  HR: 82 (06 Jul 2017 06:02) (82 - 97)  BP: 156/101 (06 Jul 2017 06:02) (128/86 - 156/101)  BP(mean): --  RR: 20 (06 Jul 2017 06:02) (17 - 20)  SpO2: 97% (06 Jul 2017 06:02) (96% - 98%)    GA: NAD, A+0 x 3  CV: RRR  Abd: + BS, soft, nontender, nondistended, no rebound or guarding, incision clean, dry and intact, uterus firm at midline,  fb below umbilicus  : lochia WNL  Extremities: no swelling or calf tenderness, reflexes +2 bilaterally

## 2017-07-06 NOTE — PROGRESS NOTE ADULT - ASSESSMENT
A/P  yo 22y    s/p c/s, POD #4  ,stable  1. Pain:opm  2. GI:reg  3. :voiding   4. DVT prophylaxis:scd,SQH  5. Dispo:pod4/5  6.f/u BP, currently on labetalol 200BID, BP today 156/101, will f/u BP today, consider discharge after controlling BP,

## 2017-07-06 NOTE — DIETITIAN INITIAL EVALUATION ADULT. - OTHER INFO
22y  s/p c/s, POD #4, stable. Pt is currently on a regular diet and tolerating PO. Consuming >75% meals. Denies N/V/D/C, no issues chewing or swallowing. Pt has NKFA for dietary restrictions. Is breast and formula feeding. Pt was lethargic during consult. Pt declined education on lactation nutrition therapy. Discussed increased nutrient needs for lactation. Pt will benefit from review before discharge. Has NKFA or dietary restrictions. Skin and GI WDL per flowsheet.

## 2017-07-10 DIAGNOSIS — F41.9 ANXIETY DISORDER, UNSPECIFIED: ICD-10-CM

## 2017-07-10 DIAGNOSIS — Z3A.39 39 WEEKS GESTATION OF PREGNANCY: ICD-10-CM

## 2017-07-10 DIAGNOSIS — I10 ESSENTIAL (PRIMARY) HYPERTENSION: ICD-10-CM

## 2017-07-10 DIAGNOSIS — R76.8 OTHER SPECIFIED ABNORMAL IMMUNOLOGICAL FINDINGS IN SERUM: ICD-10-CM

## 2017-07-10 DIAGNOSIS — Z28.21 IMMUNIZATION NOT CARRIED OUT BECAUSE OF PATIENT REFUSAL: ICD-10-CM

## 2017-07-12 DIAGNOSIS — H91.90 UNSPECIFIED HEARING LOSS, UNSPECIFIED EAR: ICD-10-CM

## 2017-07-12 DIAGNOSIS — E66.9 OBESITY, UNSPECIFIED: ICD-10-CM

## 2017-07-12 DIAGNOSIS — B00.9 HERPESVIRAL INFECTION, UNSPECIFIED: ICD-10-CM

## 2017-07-16 LAB — SURGICAL PATHOLOGY STUDY: SIGNIFICANT CHANGE UP

## 2018-09-02 ENCOUNTER — EMERGENCY (EMERGENCY)
Facility: HOSPITAL | Age: 23
LOS: 1 days | Discharge: HOME | End: 2018-09-02
Attending: EMERGENCY MEDICINE
Payer: COMMERCIAL

## 2018-09-02 VITALS
HEIGHT: 66 IN | WEIGHT: 200.62 LBS | TEMPERATURE: 98 F | RESPIRATION RATE: 16 BRPM | SYSTOLIC BLOOD PRESSURE: 123 MMHG | DIASTOLIC BLOOD PRESSURE: 82 MMHG | OXYGEN SATURATION: 99 % | HEART RATE: 90 BPM

## 2018-09-02 VITALS
RESPIRATION RATE: 17 BRPM | OXYGEN SATURATION: 100 % | TEMPERATURE: 99 F | SYSTOLIC BLOOD PRESSURE: 117 MMHG | DIASTOLIC BLOOD PRESSURE: 74 MMHG | HEART RATE: 84 BPM

## 2018-09-02 LAB
ALBUMIN SERPL ELPH-MCNC: 3.8 G/DL — SIGNIFICANT CHANGE UP (ref 3.5–5)
ALP SERPL-CCNC: 81 U/L — SIGNIFICANT CHANGE UP (ref 40–120)
ALT FLD-CCNC: 22 U/L DA — SIGNIFICANT CHANGE UP (ref 10–60)
ANION GAP SERPL CALC-SCNC: 7 MMOL/L — SIGNIFICANT CHANGE UP (ref 5–17)
APPEARANCE UR: CLEAR — SIGNIFICANT CHANGE UP
AST SERPL-CCNC: 11 U/L — SIGNIFICANT CHANGE UP (ref 10–40)
BACTERIA # UR AUTO: ABNORMAL /HPF
BASOPHILS # BLD AUTO: 0.1 K/UL — SIGNIFICANT CHANGE UP (ref 0–0.2)
BASOPHILS NFR BLD AUTO: 1.2 % — SIGNIFICANT CHANGE UP (ref 0–2)
BILIRUB SERPL-MCNC: 0.5 MG/DL — SIGNIFICANT CHANGE UP (ref 0.2–1.2)
BILIRUB UR-MCNC: NEGATIVE — SIGNIFICANT CHANGE UP
BUN SERPL-MCNC: 14 MG/DL — SIGNIFICANT CHANGE UP (ref 7–18)
CALCIUM SERPL-MCNC: 9.2 MG/DL — SIGNIFICANT CHANGE UP (ref 8.4–10.5)
CHLORIDE SERPL-SCNC: 106 MMOL/L — SIGNIFICANT CHANGE UP (ref 96–108)
CO2 SERPL-SCNC: 28 MMOL/L — SIGNIFICANT CHANGE UP (ref 22–31)
COLOR SPEC: YELLOW — SIGNIFICANT CHANGE UP
CREAT SERPL-MCNC: 0.6 MG/DL — SIGNIFICANT CHANGE UP (ref 0.5–1.3)
DIFF PNL FLD: NEGATIVE — SIGNIFICANT CHANGE UP
EOSINOPHIL # BLD AUTO: 0.2 K/UL — SIGNIFICANT CHANGE UP (ref 0–0.5)
EOSINOPHIL NFR BLD AUTO: 2.3 % — SIGNIFICANT CHANGE UP (ref 0–6)
GLUCOSE SERPL-MCNC: 83 MG/DL — SIGNIFICANT CHANGE UP (ref 70–99)
GLUCOSE UR QL: NEGATIVE — SIGNIFICANT CHANGE UP
HCG UR QL: NEGATIVE — SIGNIFICANT CHANGE UP
HCT VFR BLD CALC: 43.8 % — SIGNIFICANT CHANGE UP (ref 34.5–45)
HGB BLD-MCNC: 14.2 G/DL — SIGNIFICANT CHANGE UP (ref 11.5–15.5)
HIV 1 & 2 AB SERPL IA.RAPID: SIGNIFICANT CHANGE UP
KETONES UR-MCNC: NEGATIVE — SIGNIFICANT CHANGE UP
LEUKOCYTE ESTERASE UR-ACNC: NEGATIVE — SIGNIFICANT CHANGE UP
LYMPHOCYTES # BLD AUTO: 2.1 K/UL — SIGNIFICANT CHANGE UP (ref 1–3.3)
LYMPHOCYTES # BLD AUTO: 32 % — SIGNIFICANT CHANGE UP (ref 13–44)
MCHC RBC-ENTMCNC: 28.5 PG — SIGNIFICANT CHANGE UP (ref 27–34)
MCHC RBC-ENTMCNC: 32.3 GM/DL — SIGNIFICANT CHANGE UP (ref 32–36)
MCV RBC AUTO: 88 FL — SIGNIFICANT CHANGE UP (ref 80–100)
MONOCYTES # BLD AUTO: 0.5 K/UL — SIGNIFICANT CHANGE UP (ref 0–0.9)
MONOCYTES NFR BLD AUTO: 8.1 % — SIGNIFICANT CHANGE UP (ref 2–14)
NEUTROPHILS # BLD AUTO: 3.7 K/UL — SIGNIFICANT CHANGE UP (ref 1.8–7.4)
NEUTROPHILS NFR BLD AUTO: 56.3 % — SIGNIFICANT CHANGE UP (ref 43–77)
NITRITE UR-MCNC: NEGATIVE — SIGNIFICANT CHANGE UP
PH UR: 7 — SIGNIFICANT CHANGE UP (ref 5–8)
PLATELET # BLD AUTO: 260 K/UL — SIGNIFICANT CHANGE UP (ref 150–400)
POTASSIUM SERPL-MCNC: 4.2 MMOL/L — SIGNIFICANT CHANGE UP (ref 3.5–5.3)
POTASSIUM SERPL-SCNC: 4.2 MMOL/L — SIGNIFICANT CHANGE UP (ref 3.5–5.3)
PROT SERPL-MCNC: 7.8 G/DL — SIGNIFICANT CHANGE UP (ref 6–8.3)
PROT UR-MCNC: 15
RBC # BLD: 4.97 M/UL — SIGNIFICANT CHANGE UP (ref 3.8–5.2)
RBC # FLD: 11.8 % — SIGNIFICANT CHANGE UP (ref 10.3–14.5)
RBC CASTS # UR COMP ASSIST: SIGNIFICANT CHANGE UP /HPF (ref 0–2)
SODIUM SERPL-SCNC: 141 MMOL/L — SIGNIFICANT CHANGE UP (ref 135–145)
SP GR SPEC: 1.01 — SIGNIFICANT CHANGE UP (ref 1.01–1.02)
UROBILINOGEN FLD QL: NEGATIVE — SIGNIFICANT CHANGE UP
WBC # BLD: 6.5 K/UL — SIGNIFICANT CHANGE UP (ref 3.8–10.5)
WBC # FLD AUTO: 6.5 K/UL — SIGNIFICANT CHANGE UP (ref 3.8–10.5)
WBC UR QL: SIGNIFICANT CHANGE UP /HPF (ref 0–5)

## 2018-09-02 PROCEDURE — 86703 HIV-1/HIV-2 1 RESULT ANTBDY: CPT

## 2018-09-02 PROCEDURE — 81001 URINALYSIS AUTO W/SCOPE: CPT

## 2018-09-02 PROCEDURE — 36415 COLL VENOUS BLD VENIPUNCTURE: CPT

## 2018-09-02 PROCEDURE — 87086 URINE CULTURE/COLONY COUNT: CPT

## 2018-09-02 PROCEDURE — 80053 COMPREHEN METABOLIC PANEL: CPT

## 2018-09-02 PROCEDURE — 87591 N.GONORRHOEAE DNA AMP PROB: CPT

## 2018-09-02 PROCEDURE — 99284 EMERGENCY DEPT VISIT MOD MDM: CPT

## 2018-09-02 PROCEDURE — 85027 COMPLETE CBC AUTOMATED: CPT

## 2018-09-02 PROCEDURE — 81025 URINE PREGNANCY TEST: CPT

## 2018-09-02 PROCEDURE — 99284 EMERGENCY DEPT VISIT MOD MDM: CPT | Mod: 25

## 2018-09-02 PROCEDURE — 87491 CHLMYD TRACH DNA AMP PROBE: CPT

## 2018-09-02 PROCEDURE — 96374 THER/PROPH/DIAG INJ IV PUSH: CPT

## 2018-09-02 RX ORDER — METRONIDAZOLE 500 MG
1 TABLET ORAL
Qty: 14 | Refills: 0 | OUTPATIENT
Start: 2018-09-02 | End: 2018-09-08

## 2018-09-02 RX ORDER — ONDANSETRON 8 MG/1
8 TABLET, FILM COATED ORAL ONCE
Qty: 0 | Refills: 0 | Status: COMPLETED | OUTPATIENT
Start: 2018-09-02 | End: 2018-09-02

## 2018-09-02 RX ORDER — METRONIDAZOLE 7.5 MG/G
1 GEL VAGINAL
Qty: 60 | Refills: 0 | OUTPATIENT
Start: 2018-09-02 | End: 2018-09-06

## 2018-09-02 RX ORDER — SODIUM CHLORIDE 9 MG/ML
1000 INJECTION INTRAMUSCULAR; INTRAVENOUS; SUBCUTANEOUS ONCE
Qty: 0 | Refills: 0 | Status: COMPLETED | OUTPATIENT
Start: 2018-09-02 | End: 2018-09-02

## 2018-09-02 RX ADMIN — ONDANSETRON 8 MILLIGRAM(S): 8 TABLET, FILM COATED ORAL at 14:04

## 2018-09-02 RX ADMIN — SODIUM CHLORIDE 1000 MILLILITER(S): 9 INJECTION INTRAMUSCULAR; INTRAVENOUS; SUBCUTANEOUS at 15:55

## 2018-09-02 RX ADMIN — SODIUM CHLORIDE 2000 MILLILITER(S): 9 INJECTION INTRAMUSCULAR; INTRAVENOUS; SUBCUTANEOUS at 14:04

## 2018-09-02 NOTE — ED PROVIDER NOTE - OBJECTIVE STATEMENT
22 y/o F with no significant PMHx or no significant PSHx presents to ED c/o of nausea, weakness x yesterday. Pt states shes also been having   vaginal discharge that is smelly x "been there for awhile" but the smell is concerning to the pt. Pt states being sexually active. LMP: this month. Pt states possible pregnancy. Pt denies fever, chills, abd pain. Pt currently unemployed. NKDA

## 2018-09-02 NOTE — ED PROVIDER NOTE - MEDICAL DECISION MAKING DETAILS
22 y/o F presenting to ED with urinary sxs and nausea. Will provide IV fluids, Zofran for Nausea, basic Labs, Urine test, Pregnancy test, Syphilis test and HIV.

## 2018-09-02 NOTE — ED ADULT NURSE NOTE - NSIMPLEMENTINTERV_GEN_ALL_ED
Implemented All Universal Safety Interventions:  [place] to call system. Call bell, personal items and telephone within reach. Instruct patient to call for assistance. Room bathroom lighting operational. Non-slip footwear when patient is off stretcher. Physically safe environment: no spills, clutter or unnecessary equipment. Stretcher in lowest position, wheels locked, appropriate side rails in place.

## 2018-09-02 NOTE — ED ADULT NURSE NOTE - OBJECTIVE STATEMENT
AOX3 +ambulatory patient reports nausea and smelly discharge for about 3 weeks now. Denies any fevers or chills

## 2018-09-03 LAB
C TRACH RRNA SPEC QL NAA+PROBE: SIGNIFICANT CHANGE UP
CULTURE RESULTS: NO GROWTH — SIGNIFICANT CHANGE UP
N GONORRHOEA RRNA SPEC QL NAA+PROBE: SIGNIFICANT CHANGE UP
SPECIMEN SOURCE: SIGNIFICANT CHANGE UP
SPECIMEN SOURCE: SIGNIFICANT CHANGE UP

## 2020-08-04 NOTE — ED ADULT NURSE NOTE - NS ED NOTE ABUSE RESPONSE YN
Notified from lab of critical INR of 7.4, Dr. Eduin Zheng notified.       Charline Benedict RN  08/03/20 Olga Bray 39., RN  08/03/20 [id number] Yes

## 2021-12-26 NOTE — ED PROVIDER NOTE - DISPOSITION TYPE
Uneventful shift. Patient remains a/ox4. VSS. Mcfadden and wound vac emptied and output adequately charted in flow sheet. Patient NPO at 00 for OR in AM. No other acute events. Bed locked and in lowest position. Alarm intact. CLWR. Will continue to monitor. DISCHARGE

## 2022-11-30 NOTE — DIETITIAN INITIAL EVALUATION ADULT. - ETIOLOGY
RT s/p c/s and lactation

## 2024-06-18 NOTE — ED PROVIDER NOTE - EYES, MLM
Patient called to verity date and time of appointment with Dr Holbrook on 6/26/2024.   Clear bilaterally, pupils equal, round and reactive to light.